# Patient Record
Sex: MALE | Race: BLACK OR AFRICAN AMERICAN | NOT HISPANIC OR LATINO
[De-identification: names, ages, dates, MRNs, and addresses within clinical notes are randomized per-mention and may not be internally consistent; named-entity substitution may affect disease eponyms.]

---

## 2017-02-28 ENCOUNTER — APPOINTMENT (OUTPATIENT)
Dept: INTERNAL MEDICINE | Facility: CLINIC | Age: 62
End: 2017-02-28

## 2017-06-08 ENCOUNTER — APPOINTMENT (OUTPATIENT)
Dept: INTERNAL MEDICINE | Facility: CLINIC | Age: 62
End: 2017-06-08

## 2017-06-11 ENCOUNTER — OTHER (OUTPATIENT)
Age: 62
End: 2017-06-11

## 2017-06-13 ENCOUNTER — APPOINTMENT (OUTPATIENT)
Dept: INTERNAL MEDICINE | Facility: CLINIC | Age: 62
End: 2017-06-13

## 2017-06-13 VITALS
DIASTOLIC BLOOD PRESSURE: 90 MMHG | HEART RATE: 58 BPM | HEIGHT: 69 IN | TEMPERATURE: 98.2 F | SYSTOLIC BLOOD PRESSURE: 130 MMHG | BODY MASS INDEX: 26.51 KG/M2 | WEIGHT: 179 LBS

## 2017-06-13 RX ORDER — FLUTICASONE PROPIONATE 50 UG/1
50 SPRAY, METERED NASAL
Qty: 16 | Refills: 0 | Status: COMPLETED | COMMUNITY
Start: 2017-02-07

## 2017-06-13 RX ORDER — PROMETHAZINE HYDROCHLORIDE 6.25 MG/5ML
6.25 SOLUTION ORAL
Qty: 280 | Refills: 0 | Status: COMPLETED | COMMUNITY
Start: 2017-05-15

## 2017-06-13 RX ORDER — AZITHROMYCIN 250 MG/1
250 TABLET, FILM COATED ORAL
Qty: 6 | Refills: 0 | Status: COMPLETED | COMMUNITY
Start: 2017-05-15

## 2017-06-18 ENCOUNTER — FORM ENCOUNTER (OUTPATIENT)
Age: 62
End: 2017-06-18

## 2017-06-19 ENCOUNTER — APPOINTMENT (OUTPATIENT)
Dept: ULTRASOUND IMAGING | Facility: HOSPITAL | Age: 62
End: 2017-06-19

## 2017-06-19 ENCOUNTER — OUTPATIENT (OUTPATIENT)
Dept: OUTPATIENT SERVICES | Facility: HOSPITAL | Age: 62
LOS: 1 days | End: 2017-06-19
Payer: COMMERCIAL

## 2017-06-19 DIAGNOSIS — Z98.89 OTHER SPECIFIED POSTPROCEDURAL STATES: Chronic | ICD-10-CM

## 2017-06-19 DIAGNOSIS — R10.30 LOWER ABDOMINAL PAIN, UNSPECIFIED: ICD-10-CM

## 2017-06-19 PROCEDURE — 76882 US LMTD JT/FCL EVL NVASC XTR: CPT

## 2017-06-26 ENCOUNTER — APPOINTMENT (OUTPATIENT)
Dept: SURGERY | Facility: CLINIC | Age: 62
End: 2017-06-26

## 2017-06-26 VITALS
TEMPERATURE: 97.4 F | SYSTOLIC BLOOD PRESSURE: 150 MMHG | HEIGHT: 69 IN | HEART RATE: 56 BPM | WEIGHT: 174 LBS | DIASTOLIC BLOOD PRESSURE: 90 MMHG | BODY MASS INDEX: 25.77 KG/M2

## 2017-08-10 ENCOUNTER — RX RENEWAL (OUTPATIENT)
Age: 62
End: 2017-08-10

## 2017-08-30 ENCOUNTER — APPOINTMENT (OUTPATIENT)
Dept: INTERNAL MEDICINE | Facility: CLINIC | Age: 62
End: 2017-08-30
Payer: COMMERCIAL

## 2017-08-30 VITALS
HEART RATE: 73 BPM | HEIGHT: 69 IN | BODY MASS INDEX: 26.36 KG/M2 | OXYGEN SATURATION: 99 % | DIASTOLIC BLOOD PRESSURE: 89 MMHG | TEMPERATURE: 98 F | WEIGHT: 178 LBS | SYSTOLIC BLOOD PRESSURE: 137 MMHG

## 2017-08-30 DIAGNOSIS — Z01.818 ENCOUNTER FOR OTHER PREPROCEDURAL EXAMINATION: ICD-10-CM

## 2017-08-30 PROCEDURE — 93000 ELECTROCARDIOGRAM COMPLETE: CPT

## 2017-08-30 PROCEDURE — 82270 OCCULT BLOOD FECES: CPT

## 2017-08-30 PROCEDURE — 99214 OFFICE O/P EST MOD 30 MIN: CPT | Mod: 25

## 2017-08-30 PROCEDURE — 99386 PREV VISIT NEW AGE 40-64: CPT

## 2017-08-31 LAB
ALBUMIN SERPL ELPH-MCNC: 4.4 G/DL
ALP BLD-CCNC: 55 U/L
ALT SERPL-CCNC: 82 U/L
ANION GAP SERPL CALC-SCNC: 12 MMOL/L
APPEARANCE: ABNORMAL
AST SERPL-CCNC: 59 U/L
BACTERIA: NEGATIVE
BASOPHILS # BLD AUTO: 0.01 K/UL
BASOPHILS NFR BLD AUTO: 0.2 %
BILIRUB SERPL-MCNC: 0.6 MG/DL
BILIRUBIN URINE: NEGATIVE
BLOOD URINE: NEGATIVE
BUN SERPL-MCNC: 18 MG/DL
CALCIUM SERPL-MCNC: 9.8 MG/DL
CHLORIDE SERPL-SCNC: 103 MMOL/L
CHOLEST SERPL-MCNC: 184 MG/DL
CHOLEST/HDLC SERPL: 2.7 RATIO
CO2 SERPL-SCNC: 27 MMOL/L
COLOR: YELLOW
CREAT SERPL-MCNC: 1.25 MG/DL
EOSINOPHIL # BLD AUTO: 0.16 K/UL
EOSINOPHIL NFR BLD AUTO: 3.5 %
FRUCTOSAMINE SERPL-MCNC: 243 UMOL/L
GLUCOSE QUALITATIVE U: NORMAL MG/DL
GLUCOSE SERPL-MCNC: 91 MG/DL
HBA1C MFR BLD HPLC: 5.4 %
HCT VFR BLD CALC: 44.2 %
HDLC SERPL-MCNC: 67 MG/DL
HGB BLD-MCNC: 15.4 G/DL
HYALINE CASTS: 1 /LPF
IMM GRANULOCYTES NFR BLD AUTO: 0.2 %
KETONES URINE: NEGATIVE
LDLC SERPL CALC-MCNC: 100 MG/DL
LEUKOCYTE ESTERASE URINE: NEGATIVE
LYMPHOCYTES # BLD AUTO: 1.93 K/UL
LYMPHOCYTES NFR BLD AUTO: 41.9 %
MAN DIFF?: NORMAL
MCHC RBC-ENTMCNC: 29.8 PG
MCHC RBC-ENTMCNC: 34.8 GM/DL
MCV RBC AUTO: 85.5 FL
MICROSCOPIC-UA: NORMAL
MONOCYTES # BLD AUTO: 0.38 K/UL
MONOCYTES NFR BLD AUTO: 8.2 %
NEUTROPHILS # BLD AUTO: 2.12 K/UL
NEUTROPHILS NFR BLD AUTO: 46 %
NITRITE URINE: NEGATIVE
PH URINE: 6
PLATELET # BLD AUTO: 261 K/UL
POTASSIUM SERPL-SCNC: 4.4 MMOL/L
PROT SERPL-MCNC: 7.4 G/DL
PROTEIN URINE: NEGATIVE MG/DL
PSA FREE FLD-MCNC: 13.4
PSA FREE SERPL-MCNC: 0.54 NG/ML
PSA SERPL-MCNC: 4.04 NG/ML
RBC # BLD: 5.17 M/UL
RBC # FLD: 13 %
RED BLOOD CELLS URINE: 1 /HPF
SODIUM SERPL-SCNC: 142 MMOL/L
SPECIFIC GRAVITY URINE: 1.02
SQUAMOUS EPITHELIAL CELLS: 0 /HPF
TRIGL SERPL-MCNC: 86 MG/DL
UROBILINOGEN URINE: NORMAL MG/DL
WBC # FLD AUTO: 4.61 K/UL
WHITE BLOOD CELLS URINE: 1 /HPF

## 2017-09-04 ENCOUNTER — FORM ENCOUNTER (OUTPATIENT)
Age: 62
End: 2017-09-04

## 2017-09-05 ENCOUNTER — APPOINTMENT (OUTPATIENT)
Dept: ULTRASOUND IMAGING | Facility: HOSPITAL | Age: 62
End: 2017-09-05
Payer: COMMERCIAL

## 2017-09-05 ENCOUNTER — APPOINTMENT (OUTPATIENT)
Dept: CARDIOLOGY | Facility: CLINIC | Age: 62
End: 2017-09-05
Payer: COMMERCIAL

## 2017-09-05 ENCOUNTER — OUTPATIENT (OUTPATIENT)
Dept: OUTPATIENT SERVICES | Facility: HOSPITAL | Age: 62
LOS: 1 days | End: 2017-09-05
Payer: COMMERCIAL

## 2017-09-05 VITALS
HEIGHT: 69 IN | OXYGEN SATURATION: 99 % | RESPIRATION RATE: 17 BRPM | WEIGHT: 178 LBS | TEMPERATURE: 98.1 F | SYSTOLIC BLOOD PRESSURE: 139 MMHG | DIASTOLIC BLOOD PRESSURE: 84 MMHG | HEART RATE: 75 BPM | BODY MASS INDEX: 26.36 KG/M2

## 2017-09-05 DIAGNOSIS — Z98.89 OTHER SPECIFIED POSTPROCEDURAL STATES: Chronic | ICD-10-CM

## 2017-09-05 DIAGNOSIS — R01.1 CARDIAC MURMUR, UNSPECIFIED: ICD-10-CM

## 2017-09-05 DIAGNOSIS — N28.9 DISORDER OF KIDNEY AND URETER, UNSPECIFIED: ICD-10-CM

## 2017-09-05 LAB
25(OH)D3 SERPL-MCNC: 33.5 NG/ML
A1AT SERPL-MCNC: 124 MG/DL
HAV IGG+IGM SER QL: REACTIVE
HBV SURFACE AB SER QL: REACTIVE
HBV SURFACE AG SER QL: NONREACTIVE
HCV AB SER QL: NONREACTIVE
HCV S/CO RATIO: 0.28 S/CO
HIV1+2 AB SPEC QL IA.RAPID: NONREACTIVE
T PALLIDUM AB SER QL IA: NEGATIVE

## 2017-09-05 PROCEDURE — 76775 US EXAM ABDO BACK WALL LIM: CPT | Mod: 26

## 2017-09-05 PROCEDURE — 76775 US EXAM ABDO BACK WALL LIM: CPT

## 2017-09-05 PROCEDURE — 99244 OFF/OP CNSLTJ NEW/EST MOD 40: CPT

## 2017-10-18 ENCOUNTER — APPOINTMENT (OUTPATIENT)
Dept: INTERNAL MEDICINE | Facility: CLINIC | Age: 62
End: 2017-10-18
Payer: COMMERCIAL

## 2017-10-18 ENCOUNTER — APPOINTMENT (OUTPATIENT)
Dept: UROLOGY | Facility: CLINIC | Age: 62
End: 2017-10-18
Payer: COMMERCIAL

## 2017-10-18 VITALS
BODY MASS INDEX: 26.07 KG/M2 | DIASTOLIC BLOOD PRESSURE: 90 MMHG | SYSTOLIC BLOOD PRESSURE: 126 MMHG | HEART RATE: 58 BPM | WEIGHT: 176 LBS | HEIGHT: 69 IN | TEMPERATURE: 98.1 F

## 2017-10-18 VITALS
TEMPERATURE: 97.5 F | HEART RATE: 50 BPM | DIASTOLIC BLOOD PRESSURE: 96 MMHG | HEIGHT: 69 IN | RESPIRATION RATE: 17 BRPM | BODY MASS INDEX: 26.07 KG/M2 | WEIGHT: 176 LBS | SYSTOLIC BLOOD PRESSURE: 164 MMHG

## 2017-10-18 DIAGNOSIS — N28.9 DISORDER OF KIDNEY AND URETER, UNSPECIFIED: ICD-10-CM

## 2017-10-18 LAB
ALBUMIN SERPL ELPH-MCNC: 4.4 G/DL
ALP BLD-CCNC: 59 U/L
ALT SERPL-CCNC: 34 U/L
AST SERPL-CCNC: 31 U/L
BILIRUB DIRECT SERPL-MCNC: 0.2 MG/DL
BILIRUB INDIRECT SERPL-MCNC: 0.4 MG/DL
BILIRUB SERPL-MCNC: 0.6 MG/DL
PROT SERPL-MCNC: 7.3 G/DL

## 2017-10-18 PROCEDURE — 99214 OFFICE O/P EST MOD 30 MIN: CPT

## 2017-10-19 LAB
HBV CORE IGG+IGM SER QL: NONREACTIVE
PSA FREE FLD-MCNC: 13.2
PSA FREE SERPL-MCNC: 0.44 NG/ML
PSA SERPL-MCNC: 3.33 NG/ML

## 2017-11-30 ENCOUNTER — APPOINTMENT (OUTPATIENT)
Dept: INTERNAL MEDICINE | Facility: CLINIC | Age: 62
End: 2017-11-30

## 2018-07-15 ENCOUNTER — RX RENEWAL (OUTPATIENT)
Age: 63
End: 2018-07-15

## 2018-10-15 ENCOUNTER — RX RENEWAL (OUTPATIENT)
Age: 63
End: 2018-10-15

## 2019-01-11 ENCOUNTER — RX RENEWAL (OUTPATIENT)
Age: 64
End: 2019-01-11

## 2019-04-11 ENCOUNTER — RX RENEWAL (OUTPATIENT)
Age: 64
End: 2019-04-11

## 2019-07-10 ENCOUNTER — RX RENEWAL (OUTPATIENT)
Age: 64
End: 2019-07-10

## 2019-07-17 ENCOUNTER — APPOINTMENT (OUTPATIENT)
Dept: INTERNAL MEDICINE | Facility: CLINIC | Age: 64
End: 2019-07-17
Payer: COMMERCIAL

## 2019-07-17 VITALS
OXYGEN SATURATION: 97 % | DIASTOLIC BLOOD PRESSURE: 82 MMHG | WEIGHT: 169.4 LBS | HEART RATE: 63 BPM | TEMPERATURE: 98.3 F | SYSTOLIC BLOOD PRESSURE: 134 MMHG | HEIGHT: 69 IN | BODY MASS INDEX: 25.09 KG/M2

## 2019-07-17 DIAGNOSIS — N40.0 BENIGN PROSTATIC HYPERPLASIA WITHOUT LOWER URINARY TRACT SYMPMS: ICD-10-CM

## 2019-07-17 DIAGNOSIS — L02.214 CUTANEOUS ABSCESS OF GROIN: ICD-10-CM

## 2019-07-17 DIAGNOSIS — R74.8 ABNORMAL LEVELS OF OTHER SERUM ENZYMES: ICD-10-CM

## 2019-07-17 DIAGNOSIS — R10.31 RIGHT LOWER QUADRANT PAIN: ICD-10-CM

## 2019-07-17 DIAGNOSIS — Z00.00 ENCOUNTER FOR GENERAL ADULT MEDICAL EXAMINATION W/OUT ABNORMAL FINDINGS: ICD-10-CM

## 2019-07-17 DIAGNOSIS — R79.89 OTHER SPECIFIED ABNORMAL FINDINGS OF BLOOD CHEMISTRY: ICD-10-CM

## 2019-07-17 DIAGNOSIS — R94.31 ABNORMAL ELECTROCARDIOGRAM [ECG] [EKG]: ICD-10-CM

## 2019-07-17 PROCEDURE — 99396 PREV VISIT EST AGE 40-64: CPT | Mod: 25

## 2019-07-17 PROCEDURE — 82270 OCCULT BLOOD FECES: CPT

## 2019-07-17 PROCEDURE — 93000 ELECTROCARDIOGRAM COMPLETE: CPT

## 2019-07-17 NOTE — ASSESSMENT
[FreeTextEntry1] : health maintenance- bmi 25 Excess weight may increase the risk for many health problems, including\par •type 2 diabetes\par •high blood pressure\par •heart disease and strokes\par •certain types of cancer\par •sleep apnea\par •osteoarthritis\par •fatty liver disease\par •kidney disease\par •pregnancy problems, such as high blood sugar during pregnancy, high blood pressure, and increased risk for  delivery ()\par \par How can I tell if I weigh too much?\par \par Gaining a few pounds during the year may not seem like a big deal. But these pounds can add up over time. How can you tell if your weight could increase your chances of developing health problems? Knowing two numbers may help you understand your risk: your body mass index (BMI) score and your waist size in inches.\par \par Body Mass Index\par \par The BMI is one way to tell whether you are at a normal weight, are overweight, or have obesity. It measures your weight in relation to your height and provides a score to help place you in a category:\par •normal weight: BMI of 18.5 to 24.9\par •overweight: BMI of 25 to 29.9\par •obesity: BMI of 30 or higher\par \par For an online tool that will calculate your BMI score, see the Additional Links section.\par \par Waist Size\par \par Another important number to know is your waist size in inches. Having too much fat around your waist may increase health risks even more than having fat in other parts of your body. Women with a waist size of more than 35 inches and men with a waist size of more than 40 inches may have higher chances of developing diseases related to obesity.\par \par Know your health numbers\par \par Below are some numbers to aim for.1,2\par \par \par \par \par \par \par Measure\par \par Target\par \par \par Target BMI 18.5-24.9 \par Waist Size Men: less than 40 in.\par  Women: less than 35 in. \par Blood Pressure 120/80 mm Hg or less \par LDL (bad cholesterol) Less than 100 mg/dl \par HDL (good cholesterol) Men: more than 40 mg/dl\par  Women: more than 50 mg/dl \par Triglycerides Less than 150 mg/dl \par Blood sugar (fasting) Less than 100 mg/dl \par \par Type 2 Diabetes\par \par What is type 2 diabetes?\par \par Type 2 diabetes is a disease in which blood sugar levels are above normal. High blood sugar is a major cause of heart disease, kidney disease, stroke, amputation, and blindness. In 2009, diabetes was the seventh leading cause of death in the [place].3\par \par Type 2 diabetes is the most common type of diabetes. Family history and genes play a large role in type 2 diabetes. Other risk factors include a low activity level, poor diet, and excess body weight around the waist. In the [place], type 2 diabetes is more common among blacks, Latinos, and American Indians than among whites.4\par \par How is type 2 diabetes linked to overweight?\par \par About 80 percent of people with type 2 diabetes are overweight or obese.5 It isn't clear why people who are overweight are more likely to develop this disease. It may be that being overweight causes cells to change, making them resistant to the hormone insulin. Insulin carries sugar from blood to the cells, where it is used for energy. When a person is insulin resistant, blood sugar cannot be taken up by the cells, resulting in high blood sugar. In addition, the cells that produce insulin must work extra hard to try to keep blood sugar normal. This may cause these cells to gradually fail.\par \par How can weight loss help?\par \par If you are at risk for type 2 diabetes, losing weight may help prevent or delay the onset of diabetes. If you have type 2 diabetes, losing weight and becoming more physically active can help you control your blood sugar levels and prevent or delay health problems. Losing weight and exercising more may also allow you to reduce the amount of diabetes medicine you take.\par \par Diabetes Prevention Program\par \par The Diabetes Prevention Program (DPP) was a large clinical study sponsored by the National Institutes of Health to look at ways to prevent type 2 diabetes in adults who were overweight.\par \par The DPP found that losing just 5 to 7 percent of your body weight and doing moderately intense exercise (like brisk walking) for 150 minutes a week may prevent or delay the onset of type 2 diabetes.\par \par High Blood Pressure\par \par What is high blood pressure?\par \par Every time your heart beats, it pumps blood through your arteries to the rest of your body. Blood pressure is how hard your blood pushes against the walls of your arteries. High blood pressure (hypertension) usually has no symptoms, but it may cause serious problems, such as heart disease, stroke, and kidney failure.\par \par A blood pressure of 120/80 mm Hg (often referred to as "120 over 80") is considered normal. If the top number (systolic blood pressure) is consistently 140 or higher or the bottom number (diastolic blood pressure) is 90 or higher, you are considered to have high blood pressure.\par \par How is high blood pressure linked to overweight?\par \par High blood pressure is linked to overweight and obesity in several ways. Having a large body size may increase blood pressure because your heart needs to pump harder to supply blood to all your cells. Excess fat may also damage your kidneys, which help regulate blood pressure. \par \par How can weight loss help?\par \par Weight loss that will get you close to the normal BMI range may greatly lower high blood pressure. Other helpful changes are to quit smoking, reduce salt, and get regular physical activity. However, if lifestyle changes aren't enough, your doctor may prescribe drugs to lower your blood pressure.\par \par Heart Disease\par \par What is heart disease?\par \par Heart disease is a term used to describe several problems that may affect your heart. The most common type of problem happens when a blood vessel that carries blood to the heart becomes hard and narrow. This may keep the heart from getting all the blood it needs. Other problems may affect how well the heart pumps. If you have heart disease, you may suffer from a heart attack, heart failure, sudden cardiac death, angina (chest pain), or abnormal heart rhythm. Heart disease is the leading cause of death in the [place].3\par \par How is heart disease linked to overweight?\par \par People who are overweight or obese often have health problems that may increase the risk for heart disease. These health problems include high blood pressure, high cholesterol, and high blood sugar. In addition, excess weight may cause changes to your heart that make it work harder to send blood to all the cells in your body.\par \par How can weight loss help?\par \par Losing 5 to 10 percent of your weight may lower your chances of developing heart disease. If you weigh 200 pounds, this means losing as little as 10 pounds. Weight loss may improve blood pressure, cholesterol levels, and blood flow.\par \par Stroke\par \par What is a stroke?\par \par A stroke happens when the flow of blood to a part of your brain stops, causing brain cells to die. The most common type of stroke, called ischemic stroke, occurs when a blood clot blocks an artery that carries blood to the brain. Another type of stroke, called hemorrhagic stroke, happens when a blood vessel in the brain bursts.\par \par How are strokes linked to overweight?\par \par Overweight and obesity are known to increase blood pressure. High blood pressure is the leading cause of strokes. Excess weight also increases your chances of developing other problems linked to strokes, including high cholesterol, high blood sugar, and heart disease.\par \par How can weight loss help?\par \par One of the most important things you can do to reduce your stroke risk is to keep your blood pressure under control. Losing weight may help you lower your blood pressure. It may also improve your cholesterol and blood sugar, which may then lower your risk for stroke.\par \par Cancer\par \par What is cancer?\par \par Cancer occurs when cells in one part of the body, such as the colon, grow abnormally or out of control. The cancerous cells sometimes spread to other parts of the body, such as the liver. Cancer is the second leading cause of death in the [place].3\par \par How is cancer linked to overweight?\par \par Gaining weight as an adult increases the risk for several cancers, even if the weight gain doesn't result in overweight or obesity. It isn't known exactly how being overweight increases cancer risk. Fat cells may release hormone   He needs eye exam and up to date with vaccines and needs colonoscopy  .  sinus bradycardia  refer for evaluation with cardiologist.  prostate enlarge - psa refer to urologist .  HPN controlled continued present medication.  Mixed hyperlipidemia -  lab testing.   \par

## 2019-07-17 NOTE — PHYSICAL EXAM
[Well Developed] : well developed [Well Nourished] : well nourished [Normal Voice Quality] : was normal [Normal Verbal Skills] : the patient had normal verbal communication skills [Normal Nonverbal Skills] : normal nonverbal communication skills were demonstrated [Conjunctiva] : the conjunctiva were normal in both eyes [PERRL] : pupils were equal in size, round, and reactive to light [EOM Intact] : extraocular movements were intact [Normal Outer Ear/Nose] : the outer ears and nose were normal in appearance [Normal Oropharynx] : the oropharynx was normal [Normal TMs] : both tympanic membranes were normal [Normal Nasal Mucosa] : the nasal mucosa was normal [Normal Appearance] : was normal in appearance [Neck Supple] : was supple [Rate ___] : at [unfilled] breaths per minute [Normal Rhythm/Effort] : normal respiratory rhythm and effort [Clear Bilaterally] : the lungs were clear to auscultation bilaterally [Normal to Percussion] : the lungs were normal to percussion [5th Left ICS - MCL] : palpated at the 5th LICS in the midclavicular line [Heart Rate ___] : [unfilled] bpm [Rhythm Regular] : regular [Normal S1] : normal S1 [Normal S2] : normal S2 [No Murmur] : no murmurs heard [No Pitting Edema] : no pitting edema present [Rt] : varicose veins of the right leg noted [Lt] : varicose veins of the left leg noted [2+] : left 2+ [No Abnormalities] : the abdominal aorta was not enlarged and no bruit was heard [Examination Of The Breasts] : a normal appearance [No Discharge] : no discharge [Soft, Nontender] : the abdomen was soft and nontender [No Mass] : no masses were palpated [No HSM] : no hepatosplenomegaly noted [None] : no CVA tenderness [Normal rectal exam] : was normal [Skin Tags] : residual hemorrhoidal skin tags were noted [Stool Sample Taken] : a stool sample was obtained [Urethral Meatus] : meatus normal [Penis Abnormality] : normal circumcised penis [Urinary Bladder Findings] : the bladder was normal on palpation [Scrotum] : the scrotum was normal [Testes Mass (___cm)] : there were no testicular masses [Prostate Enlarged] : was enlarged [No Lymphangitis] : no lymphangitis observed [Normal Kyphosis] : normal kyphosis [No Visual Abnormalities] : no visible abnormalities [Normal Lordosis] : normal lordosis [No Scoliosis] : no scoliosis [No Tenderness to Palpation] : no spine tenderness on palpation [No Masses] : no masses [Full ROM] : full ROM [No Pain with ROM] : no pain with motion in any direction [Intact] : all reflexes within normal limits bilaterally [Normal Station and Gait] : the gait and station were normal [Normal Motor Tone] : the muscle tone was normal [Involuntary Movements] : no involuntary movements were seen [Normal Scalp] : inspection of the scalp showed no abnormalities [Examination Of The Hair] : texture and distribution of hair was normal [Complexion Dark] : dark complexion [Normal Mental Status] : the patient's orientation, memory, attention, language and fund of knowledge were normal [Appropriate] : appropriate [Normal Rate] : a normal rate [Normal Rhythm] : a normal rhythm [Normal Tone] : normal tone [Normal Volume] : normal volume [Normal] : normal throught processes [Cataract Right Eye] : a cataract was noted in the right eye [Cataract Left Eye] : a cataract was noted in the left eye [Enlarged Diffusely] : was not enlarged [JVP Elevated ___cm] : the JVP was not elevated [Bradycardia] : bradycardic [S3] : no S3 [S4] : no S4 [Right Carotid Bruit] : no bruit heard over the right carotid [Left Carotid Bruit] : no bruit heard over the left carotid [Right Femoral Bruit] : no bruit heard over the right femoral artery [Left Femoral Bruit] : no bruit heard over the left femoral artery [Bruit] : no bruit heard [Occult Blood Positive] : was negative for occult blood [Gross Blood] : no gross blood [Fecal Impaction] : no fecal impaction was present [Postauricular Lymph Nodes Enlarged Bilaterally] : nodes not enlarged [Preauricular Lymph Nodes Enlarged Bilaterally] : nodes not enlarged [Submandibular Lymph Nodes Enlarged Bilaterally] : nodes not enlarged [Suboccipital Lymph Nodes Enlarged Bilaterally] : nodes not enlarged [Submental Lymph Nodes Enlarged] : nodes not enlarged [Cervical Lymph Nodes Enlarged Posterior Bilaterally] : nodes not enlarged [Cervical Lymph Nodes Enlarged Anterior Bilaterally] : nodes not enlarged [Supraclavicular Lymph Nodes Enlarged Bilaterally] : nodes not enlarged [Axillary Lymph Nodes Enlarged Bilaterally] : nodes not enlarged [Epitrochlear Lymph Nodes Enlarged Bilaterally] : nodes not enlarged [Femoral Lymph Nodes Enlarged Bilaterally] : nodes not enlarged [Inguinal Lymph Nodes Enlarged Bilaterally] : nodes not enlarged [Abnormal Color] : normal color and pigmentation [Skin Lesions 1] : no skin lesions were observed [Skin Turgor Decreased] : normal skin turgor [Impaired judgment] : intact judgment [Impaired Insight] : intact insight [de-identified] : tongue normal teeth normal

## 2019-07-17 NOTE — HISTORY OF PRESENT ILLNESS
[FreeTextEntry1] : cpe  [de-identified] : Pt is a 63 yr old man who came for cpe.  he has hypertension and prostate enlargement.

## 2019-07-17 NOTE — DATA REVIEWED
[FreeTextEntry1] : ekg -rate 50 /min sinus bradycardia  septal infarc  qrs 86ms qt 450/410ms pr 170ms p 102ms

## 2019-07-17 NOTE — COUNSELING
[Healthy eating counseling provided] : healthy eating [Weigh Self Once Weekly] : Weigh self once weekly [Low Fat Diet] : Low fat diet [Low Salt Diet] : Low salt diet [Decrease Portions] : Decrease food portions [Keep Food Diary] : Keep food diary [___ min/wk activity recommended] : [unfilled] min/wk activity recommended [Walking] : Walking [Sleep ___ hours/day] : Sleep [unfilled] hours/day [Engage in a relaxing activity] : Engage in a relaxing activity [Plan in advance] : Plan in advance [None] : None

## 2019-07-17 NOTE — HEALTH RISK ASSESSMENT
[Excellent] : ~his/her~  mood as  excellent [No] : No [No falls in past year] : Patient reported no falls in the past year [0] : 2) Feeling down, depressed, or hopeless: Not at all (0) [Patient reported colonoscopy was normal] : Patient reported colonoscopy was normal [HIV Test offered] : HIV Test offered [Hepatitis C test offered] : Hepatitis C test offered [None] : None [Alone] : lives alone [College] : College [Single] : single [# Of Children ___] : has [unfilled] children [Sexually Active] : sexually active [Feels Safe at Home] : Feels safe at home [Fully functional (bathing, dressing, toileting, transferring, walking, feeding)] : Fully functional (bathing, dressing, toileting, transferring, walking, feeding) [Fully functional (using the telephone, shopping, preparing meals, housekeeping, doing laundry, using] : Fully functional and needs no help or supervision to perform IADLs (using the telephone, shopping, preparing meals, housekeeping, doing laundry, using transportation, managing medications and managing finances) [Smoke Detector] : smoke detector [Carbon Monoxide Detector] : carbon monoxide detector [Safety elements used in home] : safety elements used in home [Seat Belt] :  uses seat belt [Name: ___] : Health Care Proxy's Name: [unfilled]  [Relationship: ___] : Relationship: [unfilled] [Aggressive treatment] : aggressive treatment [FreeTextEntry1] : none  [] : No [de-identified] : exercises daily  [de-identified] : healthy eating [[id number]] : 0 [Change in mental status noted] : No change in mental status noted [Language] : denies difficulty with language [Behavior] : denies difficulty with behavior [Learning/Retaining New Information] : denies difficulty learning/retaining new information [Handling Complex Tasks] : denies difficulty handling complex tasks [Reasoning] : denies difficulty with reasoning [Spatial Ability and Orientation] : denies difficulty with spatial ability and orientation [Reports changes in hearing] : Reports no changes in hearing [Reports changes in vision] : Reports no changes in vision [Reports changes in dental health] : Reports no changes in dental health [Guns at Home] : no guns at home [Sunscreen] : does not use sunscreen [Travel to Developing Areas] : does not  travel to developing areas [TB Exposure] : is not being exposed to tuberculosis [Caregiver Concerns] : does not have caregiver concerns [ColonoscopyDate] : 2013  [HIVDate] : 2017 [HepatitisCDate] : 2017 [de-identified] : last eye exam last year  [de-identified] : one month ago [AdvancecareDate] : 07/19

## 2019-07-18 LAB
25(OH)D3 SERPL-MCNC: 32.7 NG/ML
ALBUMIN SERPL ELPH-MCNC: 4.3 G/DL
ALP BLD-CCNC: 62 U/L
ALT SERPL-CCNC: 28 U/L
ANION GAP SERPL CALC-SCNC: 12 MMOL/L
APPEARANCE: CLEAR
AST SERPL-CCNC: 36 U/L
BACTERIA: NEGATIVE
BASOPHILS # BLD AUTO: 0.01 K/UL
BASOPHILS NFR BLD AUTO: 0.3 %
BILIRUB SERPL-MCNC: 0.3 MG/DL
BILIRUBIN URINE: NEGATIVE
BLOOD URINE: NEGATIVE
BUN SERPL-MCNC: 18 MG/DL
CALCIUM OXALATE CRYSTALS: ABNORMAL
CALCIUM SERPL-MCNC: 9.5 MG/DL
CHLORIDE SERPL-SCNC: 107 MMOL/L
CHOLEST SERPL-MCNC: 157 MG/DL
CHOLEST/HDLC SERPL: 2.7 RATIO
CO2 SERPL-SCNC: 22 MMOL/L
COLOR: YELLOW
CREAT SERPL-MCNC: 1.06 MG/DL
EOSINOPHIL # BLD AUTO: 0.18 K/UL
EOSINOPHIL NFR BLD AUTO: 4.7 %
ESTIMATED AVERAGE GLUCOSE: 103 MG/DL
FRUCTOSAMINE SERPL-MCNC: 224 UMOL/L
GLUCOSE QUALITATIVE U: NEGATIVE
GLUCOSE SERPL-MCNC: 100 MG/DL
HBA1C MFR BLD HPLC: 5.2 %
HCT VFR BLD CALC: 45.5 %
HCV AB SER QL: NONREACTIVE
HCV S/CO RATIO: 0.21 S/CO
HDLC SERPL-MCNC: 59 MG/DL
HGB BLD-MCNC: 14.4 G/DL
HIV1+2 AB SPEC QL IA.RAPID: NONREACTIVE
HYALINE CASTS: 0 /LPF
IMM GRANULOCYTES NFR BLD AUTO: 0.3 %
KETONES URINE: NEGATIVE
LDLC SERPL CALC-MCNC: 81 MG/DL
LEUKOCYTE ESTERASE URINE: NEGATIVE
LYMPHOCYTES # BLD AUTO: 1.66 K/UL
LYMPHOCYTES NFR BLD AUTO: 43.3 %
MAN DIFF?: NORMAL
MCHC RBC-ENTMCNC: 29.9 PG
MCHC RBC-ENTMCNC: 31.6 GM/DL
MCV RBC AUTO: 94.6 FL
MICROSCOPIC-UA: NORMAL
MONOCYTES # BLD AUTO: 0.35 K/UL
MONOCYTES NFR BLD AUTO: 9.1 %
NEUTROPHILS # BLD AUTO: 1.62 K/UL
NEUTROPHILS NFR BLD AUTO: 42.3 %
NITRITE URINE: NEGATIVE
PH URINE: 6
PLATELET # BLD AUTO: 233 K/UL
POTASSIUM SERPL-SCNC: 4.5 MMOL/L
PROT SERPL-MCNC: 6.8 G/DL
PROTEIN URINE: NORMAL
PSA SERPL-MCNC: 3.07 NG/ML
RBC # BLD: 4.81 M/UL
RBC # FLD: 13.3 %
RED BLOOD CELLS URINE: 4 /HPF
SODIUM SERPL-SCNC: 141 MMOL/L
SPECIFIC GRAVITY URINE: 1.03
SQUAMOUS EPITHELIAL CELLS: 0 /HPF
T PALLIDUM AB SER QL IA: NEGATIVE
TRIGL SERPL-MCNC: 87 MG/DL
UROBILINOGEN URINE: NORMAL
WBC # FLD AUTO: 3.83 K/UL
WHITE BLOOD CELLS URINE: 0 /HPF

## 2019-07-19 LAB
C TRACH RRNA SPEC QL NAA+PROBE: NOT DETECTED
FERRITIN SERPL-MCNC: 177 NG/ML
IRON SATN MFR SERPL: 23 %
IRON SERPL-MCNC: 74 UG/DL
N GONORRHOEA RRNA SPEC QL NAA+PROBE: NOT DETECTED
SOURCE AMPLIFICATION: NORMAL
TIBC SERPL-MCNC: 324 UG/DL
UIBC SERPL-MCNC: 250 UG/DL

## 2019-07-22 PROBLEM — R79.89 ABNORMAL CBC: Status: ACTIVE | Noted: 2019-07-18

## 2019-07-22 LAB
BASOPHILS # BLD AUTO: 0.01 K/UL
BASOPHILS NFR BLD AUTO: 0.3 %
EOSINOPHIL # BLD AUTO: 0.17 K/UL
EOSINOPHIL NFR BLD AUTO: 4.4 %
HCT VFR BLD CALC: 43.3 %
HGB BLD-MCNC: 14.4 G/DL
IMM GRANULOCYTES NFR BLD AUTO: 0.3 %
LYMPHOCYTES # BLD AUTO: 1.35 K/UL
LYMPHOCYTES NFR BLD AUTO: 35 %
MAN DIFF?: NORMAL
MCHC RBC-ENTMCNC: 29.9 PG
MCHC RBC-ENTMCNC: 33.3 GM/DL
MCV RBC AUTO: 89.8 FL
MONOCYTES # BLD AUTO: 0.36 K/UL
MONOCYTES NFR BLD AUTO: 9.3 %
NEUTROPHILS # BLD AUTO: 1.96 K/UL
NEUTROPHILS NFR BLD AUTO: 50.7 %
PLATELET # BLD AUTO: 238 K/UL
RBC # BLD: 4.82 M/UL
RBC # FLD: 12.9 %
WBC # FLD AUTO: 3.86 K/UL

## 2019-07-24 LAB
HGB A MFR BLD: 97.3 %
HGB A2 MFR BLD: 2.7 %
HGB FRACT BLD-IMP: NORMAL

## 2019-07-26 ENCOUNTER — APPOINTMENT (OUTPATIENT)
Dept: UROLOGY | Facility: CLINIC | Age: 64
End: 2019-07-26
Payer: COMMERCIAL

## 2019-07-26 VITALS
HEART RATE: 57 BPM | WEIGHT: 175 LBS | SYSTOLIC BLOOD PRESSURE: 140 MMHG | TEMPERATURE: 97.4 F | BODY MASS INDEX: 25.92 KG/M2 | DIASTOLIC BLOOD PRESSURE: 80 MMHG | HEIGHT: 69 IN

## 2019-07-26 DIAGNOSIS — H26.9 UNSPECIFIED CATARACT: ICD-10-CM

## 2019-07-26 DIAGNOSIS — R73.9 HYPERGLYCEMIA, UNSPECIFIED: ICD-10-CM

## 2019-07-26 PROCEDURE — 99214 OFFICE O/P EST MOD 30 MIN: CPT

## 2019-07-26 NOTE — ASSESSMENT
[FreeTextEntry1] : very pleasant 63-year-old gentleman with microscopic hematuria, BPH, calcium oxalate crystals in urine\par -urinalysis reviewed\par -PSA reviewed\par -urine culture\par -CT Urogram\par -cystoscopy\par -Extensive discussion of the potential etiologies of microscopic hematuria, as well as the need to complete full work up.  Patient understands and wishes to proceed.

## 2019-07-26 NOTE — HISTORY OF PRESENT ILLNESS
[Nocturia] : nocturia [FreeTextEntry1] : Very pleasant 63-year old gentleman who presents for follow up of BPH, screening for prostate cancer and new finding of microscopic hematuria on urinalysis approximately 1 week ago. Patient reports no history of gross hematuria.  No flank pain. No suprapubic pain. No dysuria.  No urinary frequency, urgency. No history of urinary tract infections or renal impairment. No aggravating or alleviating factors that he knows of contributing to hematuria. PSA 3.07.\par \par No family history of  malignancy, including prostate cancer.  No family history of nephrolithiasis. Does not smoke.\par  [Urinary Retention] : no urinary retention [Urinary Urgency] : no urinary urgency [Urinary Frequency] : no urinary frequency [Straining] : no straining [Weak Stream] : no weak stream [Dysuria] : no dysuria [Hematuria - Gross] : no gross hematuria [Bladder Spasm] : no bladder spasm [Abdominal Pain] : no abdominal pain [Flank Pain] : no flank pain [Fever] : no fever [Fatigue] : no fatigue [Nausea] : no nausea [Anorexia] : no anorexia

## 2019-07-29 LAB — BACTERIA UR CULT: NORMAL

## 2019-08-08 ENCOUNTER — APPOINTMENT (OUTPATIENT)
Dept: CT IMAGING | Facility: HOSPITAL | Age: 64
End: 2019-08-08
Payer: COMMERCIAL

## 2019-08-08 ENCOUNTER — OUTPATIENT (OUTPATIENT)
Dept: OUTPATIENT SERVICES | Facility: HOSPITAL | Age: 64
LOS: 1 days | End: 2019-08-08
Payer: COMMERCIAL

## 2019-08-08 DIAGNOSIS — R31.29 OTHER MICROSCOPIC HEMATURIA: ICD-10-CM

## 2019-08-08 DIAGNOSIS — Z98.89 OTHER SPECIFIED POSTPROCEDURAL STATES: Chronic | ICD-10-CM

## 2019-08-08 PROCEDURE — 74178 CT ABD&PLV WO CNTR FLWD CNTR: CPT | Mod: 26

## 2019-08-08 PROCEDURE — 74178 CT ABD&PLV WO CNTR FLWD CNTR: CPT

## 2019-08-13 ENCOUNTER — APPOINTMENT (OUTPATIENT)
Dept: UROLOGY | Facility: CLINIC | Age: 64
End: 2019-08-13
Payer: COMMERCIAL

## 2019-08-13 ENCOUNTER — RX RENEWAL (OUTPATIENT)
Age: 64
End: 2019-08-13

## 2019-08-13 VITALS
HEART RATE: 65 BPM | HEIGHT: 69 IN | SYSTOLIC BLOOD PRESSURE: 155 MMHG | BODY MASS INDEX: 25.92 KG/M2 | WEIGHT: 175 LBS | DIASTOLIC BLOOD PRESSURE: 89 MMHG

## 2019-08-13 DIAGNOSIS — R97.20 ELEVATED PROSTATE, SPECIFIC ANTIGEN [PSA]: ICD-10-CM

## 2019-08-13 DIAGNOSIS — I83.90 ASYMPTOMATIC VARICOSE VEINS OF UNSPECIFIED LOWER EXTREMITY: ICD-10-CM

## 2019-08-13 DIAGNOSIS — H26.9 UNSPECIFIED CATARACT: ICD-10-CM

## 2019-08-13 PROCEDURE — 99214 OFFICE O/P EST MOD 30 MIN: CPT | Mod: 25

## 2019-08-13 PROCEDURE — 52000 CYSTOURETHROSCOPY: CPT

## 2019-08-13 NOTE — ASSESSMENT
[FreeTextEntry1] : Very pleasant 63-year-old gentleman who presents for followup of microscopic hematuria, BPH, screening for prostate cancer\par -CT images reviewed and discussed with radiologist\par -Cystoscopy today demonstrates an enlarged prostate with an intravesical component, but no urothelial lesions\par -Prior labs reviewed\par -Follow up in 6 months with urine studies and PSA

## 2019-08-13 NOTE — HISTORY OF PRESENT ILLNESS
[Urinary Retention] : no urinary retention [FreeTextEntry1] : Very pleasant 63-year-old gentleman presents for followup of microscopic hematuria and BPH. Patient recently had CT scan done which demonstrated an enlarged prostate versus a tumor at the base of the bladder. He underwent cystoscopy today which demonstrated an enlarged prostate with a median lobe, however no evidence of urothelial lesions in the bladder or the prostate. He denies dysuria. No gross hematuria. No flank pain or suprapubic pain. No nausea or vomiting. No other complaints. [Urinary Frequency] : no urinary frequency [Urinary Urgency] : no urinary urgency [Nocturia] : nocturia [Straining] : no straining [Weak Stream] : no weak stream [Dysuria] : no dysuria [Hematuria - Gross] : no gross hematuria [Bladder Spasm] : no bladder spasm [Abdominal Pain] : no abdominal pain [Flank Pain] : no flank pain [Fever] : no fever [Fatigue] : no fatigue [Anorexia] : no anorexia [Nausea] : no nausea

## 2019-08-13 NOTE — PHYSICAL EXAM
[General Appearance - Well Developed] : well developed [General Appearance - Well Nourished] : well nourished [Normal Appearance] : normal appearance [General Appearance - In No Acute Distress] : no acute distress [Well Groomed] : well groomed [Abdomen Tenderness] : non-tender [Abdomen Soft] : soft [Costovertebral Angle Tenderness] : no ~M costovertebral angle tenderness [Urethral Meatus] : meatus normal [Urinary Bladder Findings] : the bladder was normal on palpation [Scrotum] : the scrotum was normal [Testes Mass (___cm)] : there were no testicular masses [Edema] : no peripheral edema [Respiration, Rhythm And Depth] : normal respiratory rhythm and effort [] : no respiratory distress [Exaggerated Use Of Accessory Muscles For Inspiration] : no accessory muscle use [Oriented To Time, Place, And Person] : oriented to person, place, and time [Affect] : the affect was normal [Mood] : the mood was normal [Not Anxious] : not anxious [Normal Station and Gait] : the gait and station were normal for the patient's age [No Focal Deficits] : no focal deficits [No Palpable Adenopathy] : no palpable adenopathy

## 2019-08-14 ENCOUNTER — APPOINTMENT (OUTPATIENT)
Dept: CARDIOLOGY | Facility: CLINIC | Age: 64
End: 2019-08-14
Payer: COMMERCIAL

## 2019-08-14 VITALS
WEIGHT: 175 LBS | BODY MASS INDEX: 25.92 KG/M2 | TEMPERATURE: 97.8 F | SYSTOLIC BLOOD PRESSURE: 168 MMHG | OXYGEN SATURATION: 99 % | HEIGHT: 69 IN | HEART RATE: 56 BPM | DIASTOLIC BLOOD PRESSURE: 90 MMHG

## 2019-08-14 PROCEDURE — 99214 OFFICE O/P EST MOD 30 MIN: CPT

## 2019-08-14 NOTE — HISTORY OF PRESENT ILLNESS
[FreeTextEntry1] : 62 yo M with HTN who presents for follow-up visit (last seen in 2017). Patient underwent echocardiogram 8/2016 showing EF 55-60%. Patient continues to stay physically active after he retired from being a . He goes to the gym 4-5 times/week for up to 2 hours at a time, and also runs 2-3 miles several days a week. Through all this exertion patient denies any chest pain, dyspnea, palpitations, lightheadedness, syncope, or any other symptoms. Denies LE edema, orthopnea, or PND.\par Patient reports compliance with all medications, denies adverse effects.\par

## 2019-08-14 NOTE — ASSESSMENT
[FreeTextEntry1] : 64 yo M with HTN presenting for follow-up visit. \par \par 1. HLD: Lipid panel improved with lifestyle modifications\par \par 2. HTN: Suboptimally controlled on amlodipine 5mg as well as spironolactone 25mg [uncertain why spironolactone was started]\par -Will add on ACEi to CCB per ACCOMPLISH trial; will start with lisinopril 10mg PO daily and can titrate as needed\par -Given concern for hyperkalemia with ACEi and spirolactone, will have patient check chem 8 in 2 weeks. If hyperkalemic, will discontinue spironolactone in favor of uptitrating lisinopril\par -Will send patient for echocardiogram to assess for LVH\par \par \par \par

## 2019-08-27 LAB
ANION GAP SERPL CALC-SCNC: 12 MMOL/L
BUN SERPL-MCNC: 20 MG/DL
CALCIUM SERPL-MCNC: 9.9 MG/DL
CHLORIDE SERPL-SCNC: 100 MMOL/L
CO2 SERPL-SCNC: 27 MMOL/L
CREAT SERPL-MCNC: 1.19 MG/DL
GLUCOSE SERPL-MCNC: 90 MG/DL
POTASSIUM SERPL-SCNC: 4.5 MMOL/L
SODIUM SERPL-SCNC: 139 MMOL/L

## 2019-10-16 ENCOUNTER — APPOINTMENT (OUTPATIENT)
Dept: INTERNAL MEDICINE | Facility: CLINIC | Age: 64
End: 2019-10-16

## 2019-10-18 ENCOUNTER — APPOINTMENT (OUTPATIENT)
Dept: INTERNAL MEDICINE | Facility: HOSPITAL | Age: 64
End: 2019-10-18

## 2019-10-22 ENCOUNTER — RX RENEWAL (OUTPATIENT)
Age: 64
End: 2019-10-22

## 2019-11-12 ENCOUNTER — APPOINTMENT (OUTPATIENT)
Dept: INTERNAL MEDICINE | Facility: CLINIC | Age: 64
End: 2019-11-12
Payer: COMMERCIAL

## 2019-11-12 VITALS
DIASTOLIC BLOOD PRESSURE: 86 MMHG | OXYGEN SATURATION: 98 % | HEART RATE: 55 BPM | TEMPERATURE: 98.4 F | WEIGHT: 172 LBS | BODY MASS INDEX: 25.4 KG/M2 | SYSTOLIC BLOOD PRESSURE: 160 MMHG

## 2019-11-12 VITALS — DIASTOLIC BLOOD PRESSURE: 91 MMHG | SYSTOLIC BLOOD PRESSURE: 149 MMHG

## 2019-11-12 DIAGNOSIS — R00.1 BRADYCARDIA, UNSPECIFIED: ICD-10-CM

## 2019-11-12 PROCEDURE — 99215 OFFICE O/P EST HI 40 MIN: CPT | Mod: 25

## 2019-11-12 PROCEDURE — 93000 ELECTROCARDIOGRAM COMPLETE: CPT

## 2019-11-12 RX ORDER — LISINOPRIL 10 MG/1
10 TABLET ORAL DAILY
Qty: 90 | Refills: 3 | Status: DISCONTINUED | COMMUNITY
Start: 2019-08-14 | End: 2019-11-12

## 2019-11-12 NOTE — HISTORY OF PRESENT ILLNESS
[No Pertinent Cardiac History] : no history of aortic stenosis, atrial fibrillation, coronary artery disease, recent myocardial infarction, or implantable device/pacemaker [No Pertinent Pulmonary History] : no history of asthma, COPD, sleep apnea, or smoking [No Adverse Anesthesia Reaction] : no adverse anesthesia reaction in self or family member [(Patient denies any chest pain, claudication, dyspnea on exertion, orthopnea, palpitations or syncope)] : Patient denies any chest pain, claudication, dyspnea on exertion, orthopnea, palpitations or syncope [Chronic Kidney Disease] : no chronic kidney disease [Chronic Anticoagulation] : no chronic anticoagulation [Diabetes] : no diabetes [FreeTextEntry1] : colon cancer screening [FreeTextEntry3] : [place]  [FreeTextEntry4] : Pt was placed on lisinopril for hypertension but since starting he has a dry hacking cough throughout the day.  I will stop it and will try another bp medication He has been on spironolactone since 2014 .  he doesn’t have headaches sob chest pain dizziness .  pt is to have colon cancer screening.   Pt has seen cardiologist and urologist and I appreciate their recommendations and reviewed with pt findings. He had abn ct urogram and cystoscopy was done no abnormalities were found with in the bladder.   [FreeTextEntry2] : 11/22/19

## 2019-11-12 NOTE — RESULTS/DATA
[ECG Reviewed] : reviewed [Sinus Bradycardia] : sinus bradycardia [Ventricular Rate___] : ventricular rate is [unfilled] beats per minute [ECG Intervals DC.] : DC interval is normal [P Waves Normal] : the P wave is normal [FL Interval___] : [unfilled] seconds [QRS Interval___] : QRS interval: [unfilled] seconds [QTc Interval___] : QTc interval: [unfilled] [ECG Axis] : the QRS axis is normal

## 2019-11-12 NOTE — ASSESSMENT
[Ischemic Heart Disease] : Ischemic Heart Disease - No (0) [High Risk Surgery - Intraperitoneal, Intrathoracic or Supringuinal Vascular Procedures] : High Risk Surgery - Intraperitoneal, Intrathoracic or Supringuinal Vascular Procedures - No (0) [Congestive Heart Failure] : Congestive Heart Failure - No (0) [Creatinine >= 2mg/dL (1 Point)] : Creatinine >= 2mg/dL - No (0) [Prior Cerebrovascular Accident or TIA] : Prior Cerebrovascular Accident or TIA - No (0) [Insulin-dependent Diabetic (1 Point)] : Insulin-dependent Diabetic - No (0) [0] : 0 , RCRI Class: I, Risk of Post-Op Cardiac Complications: 0.4%, Procedure Risk: Low-Risk [FreeTextEntry4] : Pt is having a low risk procedure and is low risks for cardiac adverse outcome and cri is 0.4% .  he was explained the procedure colonoscopy, anesthesia   risks and complications alternatives , prep and post procedure care.  I have answered all his questions.  he will have blood testing today  .\par hpn- low salt DASH stands for Dietary Approaches to Stop Hypertension. The DASH diet can help lower high blood pressure and cholesterol and other fats in your blood. It can help lower your risk for heart attack and stroke and help you lose weight. This diet is low in sodium (salt) and rich in nutrients.\par \par \par \par \par \par How DASH Works\par \par \par \par \par \par \par The DASH diet reduces high blood pressure by lowering the amount of sodium in your diet to 2300 milligrams (mg) a day. Lowering sodium to 1500 mg a day reduces blood pressure even more. It also includes variety of foods rich in nutrients that help lower blood pressure, such as potassium, calcium, and magnesium.\par \par On the DASH diet, you will:\par •Get plenty of vegetables, fruits, and fat-free or low-fat dairy\par •Include whole grains, beans, seeds, nuts, and vegetable oils\par •Eat lean meats, poultry, and fish\par •Cut back on salt, red meat, sweets, and sugary drinks\par •Limit alcoholic beverages \par \par You should also get at least 30 minutes of moderate-intensity exercise most days of the week. Examples include brisk walking or riding a bike. Aim to get 2 hours and 30 minutes of exercise per week.\par \par You can follow the DASH diet if you want to prevent high blood pressure. It can also help you lose extra weight. Most people can benefit from lowering sodium intake to 2300 milligrams (mg) a day.\par \par Your health care provider may suggest cutting back to 1500 mg a day if you:\par •Already have high blood pressure\par •Have diabetes or chronic kidney disease\par •Are \par •Are age 51 or older \par \par If you take medicine to treat high blood pressure, do not stop taking your medicine while on the DASH diet. Be sure to tell your provider you are following the DASH diet.\par \par \par \par \par \par How to get Started\par \par \par \par \par \par \par On the DASH diet, you can eat foods from all food groups. But you eat more of the foods that are naturally low in salt, cholesterol, and saturated fats. You will also include foods that are high in potassium, calcium, magnesium, and fiber.\par \par Here's a list of the food groups and how many servings of each you should have per day. For a diet that has 2000 calories per day, you should eat:\par •Vegetables (4 to 5 servings a day)\par •Fruits (4 to 5 servings a day)\par •Low-fat or fat-free dairy products, such as milk and yogurt (2 to 3 servings a day)\par •Whole grains (7 to 8 servings a day, and 3 should be whole grains)\par •Fish, lean meats, and poultry (2 servings or less a day)\par •Beans, seeds, and nuts (4 to 5 servings a week)\par •Fats and oils (2 to 3 servings a day)\par •Sweets or added sugars, such as jelly, hard candy, maple syrup, sorbet, and sugar (fewer than 5 servings a week) \par \par The number of servings you have each day depends on how many calories you need.\par •If you're trying to lose weight, you may need fewer servings than listed.\par •If you are not very active, aim for the lower number of servings listed.\par •If you are moderately active, have the higher number of servings.\par •If you are very active, you may need more servings than listed.\par \par Your provider can help find the right number of servings a day for you.\par \par \par \par \par \par Know your Serving Sizes\par \par \par \par \par \par \par To know how much to eat, you need to know serving sizes. Below are sample servings for each food group.\par \par Vegetables:\par •1 cup (70 grams) raw leafy vegetables\par •½ cup (90 grams) chopped raw or cooked vegetables \par \par Fruits:\par •1 medium fruit (6 ounces or 168 grams)\par •½ cup (70 grams) fresh, frozen, or canned fruit\par •¼ cup (25 grams) dried fruit \par \par Fat-free or low-fat dairy products:\par •1 cup (240 milliliters) milk or yogurt\par •1½ ounce (oz) or 50 grams (g) cheese \par \par Whole grains (Aim to make all of your grain choices whole grain. Whole grain products contain more fiber and protein than "refined" grain products.):\par •1 slice bread\par •½ cup (80 grams) cooked rice, pasta, or cereal \par \par Lean meats, poultry, and fish:\par •3 oz (85 g) of cooked fish, lean meat, or poultry \par \par Nuts, seeds, and legumes:\par •½ cup (90 grams) cooked legumes (dried beans, peas)\par •1/3 cup (45 grams) nuts\par •1 tablespoon (10 grams) seeds \par \par Fats and oils:\par •1 teaspoon (5 milliliters) vegetable oil\par •2 tablespoons (30 grams) low-fat salad dressing\par •1 teaspoon (5 grams) soft margarine \par \par Sweets and added sugars:\par •1 tablespoon (15 grams) sugar\par •1 tablespoon (15 grams) jelly or jam\par •½ cup (70 grams) sorbet, gelatin dessert \par \par \par \par \par \par Tips for Following DASH\par \par \par \par \par \par \par It's easy to follow the DASH diet. But it might mean making some changes to how you currently eat. To get started:\par •DO NOT try to make changes all at once. It's fine to change your eating habits gradually.\par •To add vegetables to your diet, try having a salad at lunch. Or, add cucumber, lettuce, shredded carrots, or tomatoes to your sandwiches.\par •There should always been something green on your plate. It's fine to use frozen vegetables instead of fresh. Just make sure the package does not contain added salt or fat.\par •Add sliced fruit to your cereal or oatmeal for breakfast.\par •For dessert, choose fresh fruit or low-fat frozen yogurt instead of high-calorie sweets, such as cakes or pies.\par •Choose healthy snacks, such as unsalted rice cakes or popcorn, raw vegetables, or yogurt. Dried fruits, seeds, and nuts also make great snack choices. Just keep these portions small.\par •Think of meat as part of your meal, instead of the main course. Limit your servings of lean meat to 6 ounces (170 grams) a day. You can have two 3-ounce (85 grams) servings during the day.\par •Try cooking without meat at least twice each week. Instead, eat beans, nuts, tofu, or eggs for your protein.\par \par \par \par \par \par Tips to Lower Your Salt\par \par \par \par \par \par \par To lower the amount of salt in your diet:\par •Take the saltshaker off the table.\par •Flavor your food with herbs and spices instead of salt. Lemon, lime, and vinegar also add flavor.\par •Avoid canned foods and frozen entrees. They are often high in salt. When you make things from scratch you have more control over how much salt goes in them.\par •Check all food labels for sodium. You may be surprised at how much you find, and where you find it. Frozen dinners, soups, salad dressings, and prepared foods often have a lot of sodium.\par •Choose foods that contain less than 5% for the daily value of sodium.\par •Look for low-sodium versions of foods when you can find them.\par •Limit foods and condiments that have a lot of salt, such as pickles, olives, cured meats, ketchup, soy sauce, mustard, and barbeque sauce.\par •When dining out, ask that your food be made with no added salt or MSG. \par \par \par \par \par \par Where to get More Information\par \par \par \par \par \par \par There are many books about the DASH diet plan to help you get started. These books can also provide sample meal plans and recipe ideas.\par will change spironolactone to  chlorthalidone 12.5 mg daily and add 2.5 mg at night of amlodipine since he cant tolerate lsinopril .  WE discussed trying atacand instead but will try this first.   Although ace inhibitors reduce mortality and control hypertension better than arb he is not tolerating the drug so arb is a consideration if needed. It also doesn’t control the bp as well in  americ[place].    [FreeTextEntry7] : take amlodipine with sip of water in am  [As per surgery] : as per surgery

## 2019-11-12 NOTE — PHYSICAL EXAM
[Well Developed] : well developed [Well Nourished] : well nourished [Normal Voice Quality] : was normal [Normal Verbal Skills] : the patient had normal verbal communication skills [Normal Nonverbal Skills] : normal nonverbal communication skills were demonstrated [Normal Oropharynx] : the oropharynx was normal [No JVD] : no jugular venous distention [No Lymphadenopathy] : no lymphadenopathy [Supple] : supple [Normal Rate] : the respiratory rate was normal [Rate ___] : at [unfilled] breaths per minute [Clear Bilaterally] : the lungs were clear to auscultation bilaterally [Normal Rhythm/Effort] : normal respiratory rhythm and effort [Normal to Percussion] : the lungs were normal to percussion [5th Left ICS - MCL] : palpated at the 5th LICS in the midclavicular line [Heart Rate ___] : [unfilled] bpm [Normal S1] : normal S1 [Bradycardia] : bradycardic [Normal S2] : normal S2 [I] : a grade 1 [Rt] : varicose veins of the right leg noted [No Pitting Edema] : no pitting edema present [Lt] : varicose veins of the left leg noted [2+] : left 2+ [No Abnormalities] : the abdominal aorta was not enlarged and no bruit was heard [No Mass] : no masses were palpated [Soft, Nontender] : the abdomen was soft and nontender [No HSM] : no hepatosplenomegaly noted [Normal Motor Tone] : the muscle tone was normal [Normal Station and Gait] : the gait and station were normal [Examination Of The Hair] : texture and distribution of hair was normal [Normal Scalp] : inspection of the scalp showed no abnormalities [Involuntary Movements] : no involuntary movements were seen [Complexion Dark] : dark complexion [Normal] : normal gait, coordination grossly intact, no focal deficits and deep tendon reflexes were 2+ and symmetric [Normal Mental Status] : the patient's orientation, memory, attention, language and fund of knowledge were normal [Appropriate] : appropriate [S3] : no S3 [S4] : no S4 [Right Femoral Bruit] : no bruit heard over the right femoral artery [Right Carotid Bruit] : no bruit heard over the right carotid [Left Carotid Bruit] : no bruit heard over the left carotid [Left Femoral Bruit] : no bruit heard over the left femoral artery [Bruit] : no bruit heard [Abnormal Color] : normal color and pigmentation [Skin Lesions 1] : no skin lesions were observed [Skin Turgor Decreased] : normal skin turgor [Impaired Insight] : intact insight [Impaired judgment] : intact judgment

## 2019-11-13 LAB
ALBUMIN SERPL ELPH-MCNC: 4.6 G/DL
ALP BLD-CCNC: 65 U/L
ALT SERPL-CCNC: 24 U/L
ANION GAP SERPL CALC-SCNC: 12 MMOL/L
APPEARANCE: CLEAR
AST SERPL-CCNC: 27 U/L
BACTERIA: NEGATIVE
BASOPHILS # BLD AUTO: 0.01 K/UL
BASOPHILS NFR BLD AUTO: 0.2 %
BILIRUB SERPL-MCNC: 0.4 MG/DL
BILIRUBIN URINE: NEGATIVE
BLOOD URINE: NEGATIVE
BUN SERPL-MCNC: 14 MG/DL
CALCIUM SERPL-MCNC: 9.6 MG/DL
CHLORIDE SERPL-SCNC: 103 MMOL/L
CO2 SERPL-SCNC: 24 MMOL/L
COLOR: NORMAL
CREAT SERPL-MCNC: 1.1 MG/DL
EOSINOPHIL # BLD AUTO: 0.14 K/UL
EOSINOPHIL NFR BLD AUTO: 3.2 %
GLUCOSE QUALITATIVE U: NEGATIVE
GLUCOSE SERPL-MCNC: 85 MG/DL
HCT VFR BLD CALC: 46.3 %
HGB BLD-MCNC: 15 G/DL
HYALINE CASTS: 0 /LPF
IMM GRANULOCYTES NFR BLD AUTO: 0.2 %
KETONES URINE: NEGATIVE
LEUKOCYTE ESTERASE URINE: NEGATIVE
LYMPHOCYTES # BLD AUTO: 1.44 K/UL
LYMPHOCYTES NFR BLD AUTO: 33.2 %
MAN DIFF?: NORMAL
MCHC RBC-ENTMCNC: 29.9 PG
MCHC RBC-ENTMCNC: 32.4 GM/DL
MCV RBC AUTO: 92.4 FL
MICROSCOPIC-UA: NORMAL
MONOCYTES # BLD AUTO: 0.38 K/UL
MONOCYTES NFR BLD AUTO: 8.8 %
NEUTROPHILS # BLD AUTO: 2.36 K/UL
NEUTROPHILS NFR BLD AUTO: 54.4 %
NITRITE URINE: NEGATIVE
PH URINE: 6.5
PLATELET # BLD AUTO: 273 K/UL
POTASSIUM SERPL-SCNC: 4.5 MMOL/L
PROT SERPL-MCNC: 7.2 G/DL
PROTEIN URINE: NEGATIVE
RBC # BLD: 5.01 M/UL
RBC # FLD: 13.2 %
RED BLOOD CELLS URINE: 1 /HPF
SODIUM SERPL-SCNC: 139 MMOL/L
SPECIFIC GRAVITY URINE: 1.02
SQUAMOUS EPITHELIAL CELLS: 0 /HPF
UROBILINOGEN URINE: NORMAL
WBC # FLD AUTO: 4.34 K/UL
WHITE BLOOD CELLS URINE: 0 /HPF

## 2019-11-22 ENCOUNTER — APPOINTMENT (OUTPATIENT)
Dept: INTERNAL MEDICINE | Facility: HOSPITAL | Age: 64
End: 2019-11-22

## 2019-11-22 ENCOUNTER — OUTPATIENT (OUTPATIENT)
Dept: OUTPATIENT SERVICES | Facility: HOSPITAL | Age: 64
LOS: 1 days | End: 2019-11-22
Payer: COMMERCIAL

## 2019-11-22 DIAGNOSIS — Z98.89 OTHER SPECIFIED POSTPROCEDURAL STATES: Chronic | ICD-10-CM

## 2019-11-22 DIAGNOSIS — Z12.11 ENCOUNTER FOR SCREENING FOR MALIGNANT NEOPLASM OF COLON: ICD-10-CM

## 2019-11-22 PROCEDURE — G0121: CPT

## 2019-11-22 PROCEDURE — 45378 DIAGNOSTIC COLONOSCOPY: CPT

## 2019-12-23 ENCOUNTER — APPOINTMENT (OUTPATIENT)
Dept: INTERNAL MEDICINE | Facility: CLINIC | Age: 64
End: 2019-12-23
Payer: COMMERCIAL

## 2019-12-23 VITALS — SYSTOLIC BLOOD PRESSURE: 142 MMHG | HEART RATE: 53 BPM | DIASTOLIC BLOOD PRESSURE: 82 MMHG

## 2019-12-23 VITALS
OXYGEN SATURATION: 98 % | DIASTOLIC BLOOD PRESSURE: 80 MMHG | BODY MASS INDEX: 24.91 KG/M2 | SYSTOLIC BLOOD PRESSURE: 155 MMHG | TEMPERATURE: 98.2 F | HEART RATE: 59 BPM | WEIGHT: 168.2 LBS | HEIGHT: 69 IN

## 2019-12-23 DIAGNOSIS — Z01.818 ENCOUNTER FOR OTHER PREPROCEDURAL EXAMINATION: ICD-10-CM

## 2019-12-23 DIAGNOSIS — Z12.11 ENCOUNTER FOR SCREENING FOR MALIGNANT NEOPLASM OF COLON: ICD-10-CM

## 2019-12-23 DIAGNOSIS — K57.30 DIVERTICULOSIS OF LARGE INTESTINE W/OUT PERFORATION OR ABSCESS W/OUT BLEEDING: ICD-10-CM

## 2019-12-23 PROCEDURE — 99214 OFFICE O/P EST MOD 30 MIN: CPT

## 2019-12-23 RX ORDER — AMLODIPINE BESYLATE 2.5 MG/1
2.5 TABLET ORAL
Qty: 90 | Refills: 2 | Status: COMPLETED | COMMUNITY
Start: 2019-11-12 | End: 2019-12-23

## 2019-12-23 RX ORDER — POLYETHYLENE GLYCOL 3350 17 G/17G
17 POWDER, FOR SOLUTION ORAL
Qty: 1 | Refills: 0 | Status: COMPLETED | COMMUNITY
Start: 2019-11-12 | End: 2019-12-23

## 2019-12-23 RX ORDER — BISACODYL 5 MG/1
5 TABLET, COATED ORAL
Qty: 4 | Refills: 0 | Status: COMPLETED | COMMUNITY
Start: 2019-11-12 | End: 2019-12-23

## 2019-12-23 RX ORDER — AMLODIPINE BESYLATE 10 MG/1
10 TABLET ORAL
Qty: 1 | Refills: 3 | Status: ACTIVE | COMMUNITY
Start: 2019-12-23 | End: 1900-01-01

## 2019-12-23 NOTE — ASSESSMENT
[FreeTextEntry1] : hpn DASH stands for Dietary Approaches to Stop Hypertension. The DASH diet can help lower high blood pressure and cholesterol and other fats in your blood. It can help lower your risk for heart attack and stroke and help you lose weight. This diet is low in sodium (salt) and rich in nutrients.\par \par \par \par \par \par How DASH Works\par \par \par \par \par \par \par The DASH diet reduces high blood pressure by lowering the amount of sodium in your diet to 2300 milligrams (mg) a day. Lowering sodium to 1500 mg a day reduces blood pressure even more. It also includes variety of foods rich in nutrients that help lower blood pressure, such as potassium, calcium, and magnesium.\par \par On the DASH diet, you will:\par •Get plenty of vegetables, fruits, and fat-free or low-fat dairy\par •Include whole grains, beans, seeds, nuts, and vegetable oils\par •Eat lean meats, poultry, and fish\par •Cut back on salt, red meat, sweets, and sugary drinks\par •Limit alcoholic beverages \par \par You should also get at least 30 minutes of moderate-intensity exercise most days of the week. Examples include brisk walking or riding a bike. Aim to get 2 hours and 30 minutes of exercise per week.\par \par You can follow the DASH diet if you want to prevent high blood pressure. It can also help you lose extra weight. Most people can benefit from lowering sodium intake to 2300 milligrams (mg) a day.\par \par Your health care provider may suggest cutting back to 1500 mg a day if you:\par •Already have high blood pressure\par •Have diabetes or chronic kidney disease\par •Are \par •Are age 51 or older \par \par If you take medicine to treat high blood pressure, do not stop taking your medicine while on the DASH diet. Be sure to tell your provider you are following the DASH diet.\par \par \par \par \par \par How to get Started\par \par \par \par \par \par \par On the DASH diet, you can eat foods from all food groups. But you eat more of the foods that are naturally low in salt, cholesterol, and saturated fats. You will also include foods that are high in potassium, calcium, magnesium, and fiber.\par \par Here's a list of the food groups and how many servings of each you should have per day. For a diet that has 2000 calories per day, you should eat:\par •Vegetables (4 to 5 servings a day)\par •Fruits (4 to 5 servings a day)\par •Low-fat or fat-free dairy products, such as milk and yogurt (2 to 3 servings a day)\par •Whole grains (7 to 8 servings a day, and 3 should be whole grains)\par •Fish, lean meats, and poultry (2 servings or less a day)\par •Beans, seeds, and nuts (4 to 5 servings a week)\par •Fats and oils (2 to 3 servings a day)\par •Sweets or added sugars, such as jelly, hard candy, maple syrup, sorbet, and sugar (fewer than 5 servings a week) \par \par The number of servings you have each day depends on how many calories you need.\par •If you're trying to lose weight, you may need fewer servings than listed.\par •If you are not very active, aim for the lower number of servings listed.\par •If you are moderately active, have the higher number of servings.\par •If you are very active, you may need more servings than listed.\par \par Your provider can help find the right number of servings a day for you.\par \par \par \par \par \par Know your Serving Sizes\par \par \par \par \par \par \par To know how much to eat, you need to know serving sizes. Below are sample servings for each food group.\par \par Vegetables:\par •1 cup (70 grams) raw leafy vegetables\par •½ cup (90 grams) chopped raw or cooked vegetables \par \par Fruits:\par •1 medium fruit (6 ounces or 168 grams)\par •½ cup (70 grams) fresh, frozen, or canned fruit\par •¼ cup (25 grams) dried fruit \par \par Fat-free or low-fat dairy products:\par •1 cup (240 milliliters) milk or yogurt\par •1½ ounce (oz) or 50 grams (g) cheese \par \par Whole grains (Aim to make all of your grain choices whole grain. Whole grain products contain more fiber and protein than "refined" grain products.):\par •1 slice bread\par •½ cup (80 grams) cooked rice, pasta, or cereal \par \par Lean meats, poultry, and fish:\par •3 oz (85 g) of cooked fish, lean meat, or poultry \par \par Nuts, seeds, and legumes:\par •½ cup (90 grams) cooked legumes (dried beans, peas)\par •1/3 cup (45 grams) nuts\par •1 tablespoon (10 grams) seeds \par \par Fats and oils:\par •1 teaspoon (5 milliliters) vegetable oil\par •2 tablespoons (30 grams) low-fat salad dressing\par •1 teaspoon (5 grams) soft margarine \par \par Sweets and added sugars:\par •1 tablespoon (15 grams) sugar\par •1 tablespoon (15 grams) jelly or jam\par •½ cup (70 grams) sorbet, gelatin dessert \par \par \par \par \par \par Tips for Following DASH\par \par \par \par \par \par \par It's easy to follow the DASH diet. But it might mean making some changes to how you currently eat. To get started:\par •DO NOT try to make changes all at once. It's fine to change your eating habits gradually.\par •To add vegetables to your diet, try having a salad at lunch. Or, add cucumber, lettuce, shredded carrots, or tomatoes to your sandwiches.\par •There should always been something green on your plate. It's fine to use frozen vegetables instead of fresh. Just make sure the package does not contain added salt or fat.\par •Add sliced fruit to your cereal or oatmeal for breakfast.\par •For dessert, choose fresh fruit or low-fat frozen yogurt instead of high-calorie sweets, such as cakes or pies.\par •Choose healthy snacks, such as unsalted rice cakes or popcorn, raw vegetables, or yogurt. Dried fruits, seeds, and nuts also make great snack choices. Just keep these portions small.\par •Think of meat as part of your meal, instead of the main course. Limit your servings of lean meat to 6 ounces (170 grams) a day. You can have two 3-ounce (85 grams) servings during the day.\par •Try cooking without meat at least twice each week. Instead, eat beans, nuts, tofu, or eggs for your protein.\par \par \par \par \par \par Tips to Lower Your Salt\par \par \par \par \par \par \par To lower the amount of salt in your diet:\par •Take the saltshaker off the table.\par •Flavor your food with herbs and spices instead of salt. Lemon, lime, and vinegar also add flavor.\par •Avoid canned foods and frozen entrees. They are often high in salt. When you make things from scratch you have more control over how much salt goes in them.\par •Check all food labels for sodium. You may be surprised at how much you find, and where you find it. Frozen dinners, soups, salad dressings, and prepared foods often have a lot of sodium.\par •Choose foods that contain less than 5% for the daily value of sodium.\par •Look for low-sodium versions of foods when you can find them.\par •Limit foods and condiments that have a lot of salt, such as pickles, olives, cured meats, ketchup, soy sauce, mustard, and barbeque sauce.\par •When dining out, ask that your food be made with no added salt or MSG. \par \par \par \par \par \par Where to get More Information\par \par \par \par \par \par \par There are many books about the DASH diet plan to help you get started. These books can also provide sample meal plans and recipe ideas.\par he will have amlodipine increased to  10 mgs.  he had diverticuloses - we discussed his  treatment and what to look for if he develops diverticulitis and also about diet . Dietary fiber — found mainly in fruits, vegetables, whole grains and legumes — is probably best known for its ability to prevent or relieve constipation. But foods containing fiber can provide other health benefits as well, such as helping to maintain a healthy weight and lowering your risk of diabetes and heart disease.\par \par Selecting tasty foods that provide fiber isn't difficult. Find out how much dietary fiber you need, the foods that contain it, and how to add them to meals and snacks.\par \par Dietary fiber, also known as roughage or bulk, includes the parts of plant foods your body can't digest or absorb. Unlike other food components, such as fats, proteins or carbohydrates — which your body breaks down and absorbs — fiber isn't digested by your body. Instead, it passes relatively intact through your stomach, small intestine and colon and out of your body.\par \par Fiber is commonly classified as soluble, which dissolves in water, or insoluble, which doesn't dissolve.\par •Soluble fiber. This type of fiber dissolves in water to form a gel-like material. It can help lower blood cholesterol and glucose levels. Soluble fiber is found in oats, peas, beans, apples, citrus fruits, carrots, barley and psyllium.\par •Insoluble fiber. This type of fiber promotes the movement of material through your digestive system and increases stool bulk, so it can be of benefit to those who struggle with constipation or irregular stools. Whole-wheat flour, wheat bran, nuts, beans and vegetables, such as cauliflower, green beans and potatoes, are good sources of insoluble fiber.\par \par Most plant-based foods, such as oatmeal and beans, contain both soluble and insoluble fiber. However, the amount of each type varies in different plant foods. To receive the greatest health benefit, eat a wide variety of high-fiber foods.\par \par A high-fiber diet has many benefits, which include:\par •Normalizes bowel movements. Dietary fiber increases the weight and size of your stool and softens it. A bulky stool is easier to pass, decreasing your chance of constipation. If you have loose, watery stools, fiber may help to solidify the stool because it absorbs water and adds bulk to stool.\par •Helps maintain bowel health. A high-fiber diet may lower your risk of developing hemorrhoids and small pouches in your colon (diverticular disease). Some fiber is fermented in the colon. Researchers are looking at how this may play a role in preventing diseases of the colon.\par •Lowers cholesterol levels. Soluble fiber found in beans, oats, flaxseed and oat bran may help lower total blood cholesterol levels by lowering low-density lipoprotein, or "bad," cholesterol levels. Studies also have shown that high-fiber foods may have other heart-health benefits, such as reducing blood pressure and inflammation.\par •Helps control blood sugar levels. In people with diabetes, fiber — particularly soluble fiber — can slow the absorption of sugar and help improve blood sugar levels. A healthy diet that includes insoluble fiber may also reduce the risk of developing type 2 diabetes.\par •Aids in achieving healthy weight. High-fiber foods tend to be more filling than low-fiber foods, so you're likely to eat less and stay satisfied longer. And high-fiber foods tend to take longer to eat and to be less "energy dense," which means they have fewer calories for the same volume of food.\par \par Another benefit attributed to dietary fiber is prevention of colorectal cancer. However, the evidence that fiber reduces colorectal cancer is mixed.\par \par The [place] [employer], which provides science-based advice on matters of medicine and health, gives the following daily fiber recommendations for adults:\par \par \par \par Age 50 or younger\par \par Age 51 or older\par \par \par [place] [employer] \par \par Men 38 grams 30 grams \par Women 25 grams 21 grams \par \par If you aren't getting enough fiber each day, you may need to boost your intake. Good choices include:\par •Whole-grain products\par •Fruits\par •Vegetables\par •Beans, peas and other legumes\par •Nuts and seeds\par \par Refined or processed foods — such as canned fruits and vegetables, pulp-free juices, white breads and pastas, and non-whole-grain cereals — are lower in fiber. The grain-refining process removes the outer coat (bran) from the grain, which lowers its fiber content. Enriched foods have some of the B vitamins and iron back after processing, but not the fiber.\par \par Whole foods rather than fiber supplements are generally better. Fiber supplements — such as Metamucil, Citrucel and FiberCon — don't provide the variety of fibers, vitamins, minerals and other beneficial nutrients that foods do.\par \par Another way to get more fiber is to eat foods, such as cereal, granola bars, yogurt, and ice cream, with fiber added. The added fiber usually is labeled as "inulin" or "chicory root." Some people complain of gassiness after eating foods with added fiber.\par \par However, some people may still need a fiber supplement if dietary changes aren't sufficient or if they have certain medical conditions, such as constipation, diarrhea or irritable bowel syndrome. Check with your doctor before taking fiber supplements.\par \par Need ideas for adding more fiber to your meals and snacks? Try these suggestions:\par •Jump-start your day. For breakfast choose a high-fiber breakfast cereal — 5 or more grams of fiber a serving. Opt for cereals with "whole grain," "bran" or "fiber" in the name. Or add a few tablespoons of unprocessed wheat bran to your favorite cereal.\par •Switch to whole grains. Consume at least half of all grains as whole grains. Look for breads that list whole wheat, whole-wheat flour or another whole grain as the first ingredient on the label and have least 2 grams of dietary fiber a serving. Sugarloaf with brown rice, wild rice, barley, whole-wheat pasta and bulgur wheat.\par •Bulk up baked goods. Substitute whole-grain flour for half or all of the white flour when baking. Try adding crushed bran cereal, unprocessed wheat bran or uncooked oatmeal to muffins, cakes and cookies.\par •Lean on legumes. Beans, peas and lentils are excellent sources of fiber. Add kidney beans to canned soup or a green salad. Or make nachos with refried black beans, lots of fresh veggies, whole-wheat tortilla chips and salsa.\par •Eat more fruit and vegetables. Fruits and vegetables are rich in fiber, as well as vitamins and minerals. Try to eat five or more servings daily.\par •Make snacks count. Fresh fruits, raw vegetables, low-fat popcorn and whole-grain crackers are all good choices. An occasional handful of nuts or dried fruits also is a healthy, high-fiber snack — although be aware that nuts and dried fruits are high in calories.\par \par High-fiber foods are good for your health. But adding too much fiber too quickly can promote intestinal gas, abdominal bloating and cramping. Increase fiber in your diet gradually over a period of a few weeks. This allows the natural bacteria in your digestive system to adjust to the change.\par \par Also, drink plenty of water. Fiber works best when it absorbs water, making your stool soft and bulky Diverticulosis merely describes the presence of diverticula. Diverticulosis is often found during a test done for other reasons, such as flexible sigmoidoscopy, colonoscopy, or barium enema. Most people with diverticulosis have no symptoms and will remain symptom free for the rest of their lives. (See 'Diverticular disease prognosis' below.)\par \par A person with diverticulosis may have diverticulitis, or diverticular bleeding.\par \par Diverticulitis — Inflammation of a diverticulum (diverticulitis) occurs when there is thinning and breakdown of the diverticular wall. This may be caused by increased pressure within the colon or by hardened particles of stool, which can become lodged within the diverticulum.\par \par The symptoms of diverticulitis depend upon the degree of inflammation present. The most common symptom is pain in the left lower abdomen. Other symptoms can include nausea and vomiting, constipation, diarrhea, and urinary symptoms such as pain or burning when urinating or the frequent need to urinate.\par \par Diverticulitis is divided into simple and complicated forms.\par \par ?Simple diverticulitis, which accounts for 75 percent of cases, is not associated with complications and typically responds to medical treatment without surgery.\par \par \par ?Complicated diverticulitis occurs in 25 percent of cases and usually requires surgery. Complications associated with diverticulitis can include the following:\par \par \par •Abscess – a localized collection of pus\par \par •Fistula – an abnormal tract between two areas that are not normally connected (eg, bowel and bladder)\par \par •Obstruction – a blockage of the colon\par \par •Peritonitis – infection involving the space around the abdominal organ\par \par •Sepsis – overwhelming body-wide infection that can lead to failure of multiple organs\par \par \par Diverticular bleeding — Diverticular bleeding occurs when a small artery located within a diverticulum is eroded and bleeds into the colon.\par \par Diverticular bleeding usually causes painless bleeding from the rectum. In approximately 50 percent of cases, the person will see maroon or bright red blood with bowel movements.\par \par Is bleeding with a bowel movement normal? — It is not normal to see blood in a bowel movement; this can be a sign of several conditions, most of which are not serious (eg, hemorrhoids) but some of which are serious and require immediate treatment. Anyone who sees blood after a bowel movement should consult with their healthcare provider to determine if further testing or evaluation is needed. (See "Patient education: Blood in the stool (rectal bleeding) in adults (Beyond the Basics)".)\par \par \par DIVERTICULOSIS AND DIVERTICULITIS DIAGNOSIS\par Diverticulosis is often found during tests performed for other reasons. \par \par ?Barium enema – This is an x-ray study that uses barium in an enema to view the outline of the lower intestinal tract. This is an older test and has been largely replaced by computed tomography (CT) scan.\par \par \par ?Flexible sigmoidoscopy – This is an examination of the inside of the sigmoid colon with a thin, flexible tube that contains a camera. (See "Patient education: Flexible sigmoidoscopy (Beyond the Basics)".)\par \par \par ?Colonoscopy – This is an examination of the inside of the entire colon. (See "Patient education: Colonoscopy (Beyond the Basics)".)\par \par \par ?CT scan – A CT scan is often used to diagnose diverticulitis and its complications. If diverticulitis (not just diverticulosis) is suspected, the above three tests should not be used because of the risk of perforation.\par \par \par \par TREATMENT\par \par Diverticulosis — People with diverticulosis who do not have symptoms do not require treatment. However, most clinicians recommend increasing fiber in the diet, which can help to bulk the stools and possibly prevent the development of new diverticula, diverticulitis, or diverticular bleeding. Fiber is not proven to prevent these conditions in all patients but may help to control recurrent episodes in some.\par \par Increase fiber — Fruits and vegetables are a good source of fiber (table 1). The fiber content of packaged foods can be calculated by reading the nutrition label (figure 2). (See "Patient education: High-fiber diet (Beyond the Basics)".)\par \par Seeds and nuts — Patients with diverticular disease have historically been advised to avoid whole pieces of fiber (such as seeds, corn, and nuts) because of concern that these foods could cause an episode of diverticulitis. However, this belief is completely unproven. We do not suggest that patients with diverticulosis avoid seeds, corn, or nuts.\par \par Diverticulitis — Treatment of diverticulitis depends upon how severe your symptoms are.\par \par Home treatment — If you have mild symptoms of diverticulitis (mild abdominal pain, usually left lower abdomen), you can be treated at home with a clear liquid diet and oral antibiotics. However, if you develop one or more of the following signs or symptoms, you should seek immediate medical attention:\par \par ?Temperature >100.1°F (38°C)\par \par ?Worsening or severe abdominal pain\par \par ?An inability to tolerate fluids\par \par

## 2019-12-23 NOTE — HEALTH RISK ASSESSMENT
[No] : No [No falls in past year] : Patient reported no falls in the past year [] : No [de-identified] : low salt  [de-identified] : regularly

## 2019-12-23 NOTE — PHYSICAL EXAM
[PERRL] : pupils equal round and reactive to light [Normal Oropharynx] : the oropharynx was normal [Normal Rate] : normal rate  [Supple] : supple [Normal S1, S2] : normal S1 and S2 [Regular Rhythm] : with a regular rhythm [No Extremity Clubbing/Cyanosis] : no extremity clubbing/cyanosis [No Edema] : there was no peripheral edema [No Spinal Tenderness] : no spinal tenderness [No CVA Tenderness] : no CVA  tenderness [No Joint Swelling] : no joint swelling [Grossly Normal Strength/Tone] : grossly normal strength/tone [Normal] : normal gait, coordination grossly intact, no focal deficits and deep tendon reflexes were 2+ and symmetric

## 2019-12-23 NOTE — HISTORY OF PRESENT ILLNESS
[FreeTextEntry1] : hypertension diverticuli  [de-identified] : Pt is feeling well and had colonoscopy and had diverticuli and we discussed high fiber diet and prevention.  He has elevated bp and following healthy living and low salt  and takes his medication.

## 2020-02-18 ENCOUNTER — APPOINTMENT (OUTPATIENT)
Dept: UROLOGY | Facility: CLINIC | Age: 65
End: 2020-02-18
Payer: COMMERCIAL

## 2020-02-18 VITALS — SYSTOLIC BLOOD PRESSURE: 144 MMHG | DIASTOLIC BLOOD PRESSURE: 82 MMHG | HEART RATE: 56 BPM

## 2020-02-18 DIAGNOSIS — N40.1 BENIGN PROSTATIC HYPERPLASIA WITH LOWER URINARY TRACT SYMPMS: ICD-10-CM

## 2020-02-18 DIAGNOSIS — N13.8 BENIGN PROSTATIC HYPERPLASIA WITH LOWER URINARY TRACT SYMPMS: ICD-10-CM

## 2020-02-18 DIAGNOSIS — E78.2 MIXED HYPERLIPIDEMIA: ICD-10-CM

## 2020-02-18 DIAGNOSIS — R31.29 OTHER MICROSCOPIC HEMATURIA: ICD-10-CM

## 2020-02-18 DIAGNOSIS — Z12.5 ENCOUNTER FOR SCREENING FOR MALIGNANT NEOPLASM OF PROSTATE: ICD-10-CM

## 2020-02-18 LAB
PSA FREE FLD-MCNC: 15 %
PSA FREE SERPL-MCNC: 0.58 NG/ML
PSA SERPL-MCNC: 3.94 NG/ML

## 2020-02-18 PROCEDURE — 99213 OFFICE O/P EST LOW 20 MIN: CPT

## 2020-02-18 NOTE — ASSESSMENT
[FreeTextEntry1] : Very pleasant 64-year-old gentleman who presents for followup of BPH with urinary obstruction, screening for prostate cancer\par -PSA today\par -Will call with results of PSA\par -Follow up in one year if PSA is normal

## 2020-02-18 NOTE — PHYSICAL EXAM
[General Appearance - Well Developed] : well developed [Normal Appearance] : normal appearance [General Appearance - Well Nourished] : well nourished [Well Groomed] : well groomed [General Appearance - In No Acute Distress] : no acute distress [Abdomen Tenderness] : non-tender [Costovertebral Angle Tenderness] : no ~M costovertebral angle tenderness [Abdomen Soft] : soft [Urethral Meatus] : meatus normal [Scrotum] : the scrotum was normal [Urinary Bladder Findings] : the bladder was normal on palpation [No Prostate Nodules] : no prostate nodules [Testes Mass (___cm)] : there were no testicular masses [Edema] : no peripheral edema [Exaggerated Use Of Accessory Muscles For Inspiration] : no accessory muscle use [] : no respiratory distress [Respiration, Rhythm And Depth] : normal respiratory rhythm and effort [Mood] : the mood was normal [Affect] : the affect was normal [Oriented To Time, Place, And Person] : oriented to person, place, and time [No Focal Deficits] : no focal deficits [Normal Station and Gait] : the gait and station were normal for the patient's age [Not Anxious] : not anxious [No Palpable Adenopathy] : no palpable adenopathy

## 2020-02-18 NOTE — HISTORY OF PRESENT ILLNESS
[FreeTextEntry1] : Very pleasant 64-year-old gentleman who presents for followup of BPH and screening for prostate cancer. Patient feels well. He denies dysuria. No hematuria. No flank pain or suprapubic pain. No nausea or vomiting. He reports a strong urinary stream and no complaints with urination other than nocturia. No other complaints. [Urinary Retention] : no urinary retention [Urinary Urgency] : no urinary urgency [Urinary Frequency] : no urinary frequency [Nocturia] : nocturia [Straining] : no straining [Dysuria] : no dysuria [Weak Stream] : no weak stream [Abdominal Pain] : no abdominal pain [Bladder Spasm] : no bladder spasm [Hematuria - Gross] : no gross hematuria [Flank Pain] : no flank pain [Fever] : no fever [Fatigue] : no fatigue [Nausea] : no nausea [Anorexia] : no anorexia

## 2020-02-19 ENCOUNTER — APPOINTMENT (OUTPATIENT)
Dept: CARDIOLOGY | Facility: CLINIC | Age: 65
End: 2020-02-19
Payer: COMMERCIAL

## 2020-02-19 VITALS
DIASTOLIC BLOOD PRESSURE: 89 MMHG | HEART RATE: 73 BPM | TEMPERATURE: 97.7 F | SYSTOLIC BLOOD PRESSURE: 157 MMHG | BODY MASS INDEX: 26.29 KG/M2 | OXYGEN SATURATION: 98 % | WEIGHT: 178 LBS

## 2020-02-19 VITALS — SYSTOLIC BLOOD PRESSURE: 144 MMHG | DIASTOLIC BLOOD PRESSURE: 82 MMHG

## 2020-02-19 DIAGNOSIS — I10 ESSENTIAL (PRIMARY) HYPERTENSION: ICD-10-CM

## 2020-02-19 PROCEDURE — 93000 ELECTROCARDIOGRAM COMPLETE: CPT

## 2020-02-19 PROCEDURE — 99213 OFFICE O/P EST LOW 20 MIN: CPT

## 2020-02-19 NOTE — HISTORY OF PRESENT ILLNESS
[FreeTextEntry1] : 65 yo M with HTN who presents for follow-up visit. Patient underwent echocardiogram 8/2016 showing EF 55-60%. Patient continues to stay physically active after he retired from being a . He goes to the gym 4-5 times/week for up to 2 hours at a time, and also runs 2-3 miles several days a week. Through all this exertion patient denies any chest pain, dyspnea, palpitations, lightheadedness, syncope, or any other symptoms. Denies LE edema, orthopnea, or PND.\par Patient reports compliance with all medications, denies adverse effects.\par \par His lisinopril and spironolactone were discontinued (the former due to cough), instead he was started on chlorthalidone 12.5mg and his amlodipine was increased to 10mg. \par

## 2020-02-19 NOTE — ASSESSMENT
[FreeTextEntry1] : 63 yo M with HTN presenting for follow-up visit. \par \par 1. HLD: Lipid panel improved with lifestyle modifications\par \par 2. HTN: Currently on amlodipine and chlorthalidone; BP is around 140/80 at home per patient, compatible with what I measured in the office\par -This is adequately controlled (target < 140); will continue to monitor and if needed can increase chlorthalidone to 25mg\par -Due to cough with lisinopril, if additional agent is needed in future would consider ARB. \par -Patient states he underwent echocardiogram last year but I did not see report copy in the chart; will search echo database to try to locate report. \par \par \par \par

## 2020-03-20 RX ORDER — CHLORTHALIDONE 25 MG/1
25 TABLET ORAL DAILY
Qty: 15 | Refills: 2 | Status: ACTIVE | COMMUNITY
Start: 2019-11-12 | End: 1900-01-01

## 2020-03-25 ENCOUNTER — APPOINTMENT (OUTPATIENT)
Dept: INTERNAL MEDICINE | Facility: CLINIC | Age: 65
End: 2020-03-25

## 2020-08-19 ENCOUNTER — APPOINTMENT (OUTPATIENT)
Dept: CARDIOLOGY | Facility: CLINIC | Age: 65
End: 2020-08-19

## 2021-02-24 ENCOUNTER — APPOINTMENT (OUTPATIENT)
Dept: UROLOGY | Facility: CLINIC | Age: 66
End: 2021-02-24